# Patient Record
Sex: FEMALE | Race: WHITE | Employment: FULL TIME | URBAN - METROPOLITAN AREA
[De-identification: names, ages, dates, MRNs, and addresses within clinical notes are randomized per-mention and may not be internally consistent; named-entity substitution may affect disease eponyms.]

---

## 2018-09-28 ENCOUNTER — TELEPHONE (OUTPATIENT)
Dept: SURGERY | Age: 58
End: 2018-09-28

## 2018-10-03 ENCOUNTER — OFFICE VISIT (OUTPATIENT)
Dept: SURGERY | Age: 58
End: 2018-10-03

## 2018-10-03 ENCOUNTER — TELEPHONE (OUTPATIENT)
Dept: SURGERY | Age: 58
End: 2018-10-03

## 2018-10-03 VITALS
BODY MASS INDEX: 27.66 KG/M2 | TEMPERATURE: 97.2 F | WEIGHT: 166 LBS | HEIGHT: 65 IN | HEART RATE: 77 BPM | DIASTOLIC BLOOD PRESSURE: 82 MMHG | SYSTOLIC BLOOD PRESSURE: 153 MMHG

## 2018-10-03 DIAGNOSIS — N63.0 LUMP OR MASS IN BREAST: Primary | ICD-10-CM

## 2018-10-03 RX ORDER — ACETAMINOPHEN 325 MG/1
TABLET ORAL
COMMUNITY

## 2018-10-03 RX ORDER — GLUCOSAMINE SULFATE 1500 MG
1000 POWDER IN PACKET (EA) ORAL
COMMUNITY

## 2018-10-03 RX ORDER — DIAPER,BRIEF,ADULT, DISPOSABLE
EACH MISCELLANEOUS
COMMUNITY

## 2018-10-03 NOTE — PROGRESS NOTES
Chief Complaint   Patient presents with   [address] [place] [address] Breast Mass     Surgical evaluation of left breast mass. Referred by Dr. Ren Esquivel. Denies pain. 1. Have you been to the ER, urgent care clinic since your last visit? Hospitalized since your last visit? No    2. Have you seen or consulted any other health care providers outside of the [address] [place] [address] since your last visit? Include any pap smears or colon screening.  No

## 2018-10-03 NOTE — MR AVS SNAPSHOT
[address] [facility] [id number] 
[phone] Patient: Ector Reyna MRN: [id number] :1960 Visit Information Date & Time Provider Department Dept. Phone Encounter #  
 10/3/2018  3:30 PM Abdoul Montesinos MD [address] [phone] [id number] Upcoming Health Maintenance Date Due Hepatitis C Screening 1960 DTaP/Tdap/Td series (1 - Tdap) 1980 Shingrix Vaccine Age 50> (1 of 2) 2009 BREAST CANCER SCRN MAMMOGRAM 2009 FOBT Q 1 YEAR AGE 50-75 2009 PAP AKA CERVICAL CYTOLOGY 2014 Influenza Age 5 to Adult 2018 Allergies as of 10/3/2018  Review Complete On: 10/3/2018 By: Abdoul Montesinos MD  
  
 Severity Noted Reaction Type Reactions Erythromycin  2009    Nausea and Vomiting Severe abd cramps. \"out of body-like experience\" Current Immunizations  Never Reviewed No immunizations on file. Not reviewed this visit You Were Diagnosed With   
  
 Codes Comments Lump or mass in breast    -  Primary ICD-10-CM: N63.0 ICD-9-CM: 611.72 Vitals BP Pulse Temp Height(growth percentile) Weight(growth percentile) 153/82 (BP 1 Location: Right arm, BP Patient Position: Sitting) 77 97.2 °F (36.2 °C) (Oral) 5' 5\" (1.651 m) 166 lb (75.3 kg) BMI Smoking Status 27.62 kg/m2 Never Assessed BMI and BSA Data Body Mass Index Body Surface Area  
 27.62 kg/m 2 1.86 m 2 Your Updated Medication List  
  
   
This list is accurate as of 10/3/18  4:14 PM.  Always use your most recent med list.  
  
  
  
  
 cholecalciferol 1,000 unit Cap Commonly known as:  VITAMIN D3  
1,000 Units. L-LYSINE 500 mg Tab tablet Generic drug:  lysine Take  by mouth three (3) times daily (with meals). TYLENOL 325 mg tablet Generic drug:  acetaminophen Take  by mouth every four (4) hours as needed for Pain. Introducing [facility]! [facility] introduces Openet patient portal. Now you can access parts of your medical record, email your doctor's office, and request medication refills online. 1. In your internet browser, go to [email or web address] 2. Click on the First Time User? Click Here link in the Sign In box. You will see the New Member Sign Up page. 3. Enter your Openet Access Code exactly as it appears below. You will not need to use this code after youve completed the sign-up process. If you do not sign up before the expiration date, you must request a new code. · Openet Access Code: [email or web address] Expires: 1/1/2019  2:52 PM 
 
4. Enter the last four digits of your Social Security Number (xxxx) and Date of Birth (mm/dd/yyyy) as indicated and click Submit. You will be taken to the next sign-up page. 5. Create a Openet ID. This will be your Openet login ID and cannot be changed, so think of one that is secure and easy to remember. 6. Create a Openet password. You can change your password at any time. 7. Enter your Password Reset Question and Answer. This can be used at a later time if you forget your password. 8. Enter your e-mail address. You will receive e-mail notification when new information is available in [address] 9. Click Sign Up. You can now view and download portions of your medical record. 10. Click the Download Summary menu link to download a portable copy of your medical information. If you have questions, please visit the Frequently Asked Questions section of the Openet website. Remember, Openet is NOT to be used for urgent needs. For medical emergencies, dial 911. Now available from your iPhone and Android! Please provide this summary of care documentation to your next provider. Your primary care clinician is listed as Carmella Pinto.  If you have any questions after today's visit, please call [phone].

## 2018-10-03 NOTE — PROGRESS NOTES
Breat Consult      Ms. Kodi Regalado is a 61year old woman who was referred for abnormal imaging. The area of concern was identified on routine screening exam. She denies breast pain, mass or nipple discharge. Additional work up performed includes diagnostic mammogram and ultrasound. Her most recent previous mammogram was 9/21/17. There is no family history of breast cancer. Breast/GYN history:    Past Medical History:   Diagnosis Date    Herpes     HPV (human papilloma virus) anogenital infection        Past Surgical History:   Procedure Laterality Date    HX TONSIL AND ADENOIDECTOMY         No current outpatient prescriptions on file prior to visit. No current facility-administered medications on file prior to visit. Allergies   Allergen Reactions    Erythromycin Nausea and Vomiting     Severe abd cramps. \"out of body-like experience\"       Social History   Substance Use Topics    Smoking status: Not on file    Smokeless tobacco: Not on file    Alcohol use Not on file       No family history on file.     OB History     No data available            ROS:   General: denies fevers, chills, night sweats, fatigue, weight loss, weight gain  GI: denies nausea, vomiting, abdominal pain, change in bowel habits, hematochezia, melena, GERD  Integ: denies dermatitis, abnormal moles  HEENT: denies visual changes, vertigo, epistaxis, dysphagia, hoarseness  Cardiac: denies chest pain, palpitations, HTN, edema, varicosities  Resp: denies cough, shortness of breath, wheezing, hemoptysis, snoring, reactive airway disease  : denies hematuria, dysuria, frequency, urgency, nocturia, stress urinary incontinence   MSK: denies weakness, joint pain, arthritis  Endocrine: denies diabetes, thyroid disease, polyuria, polydipsia, polyphagia, poor wound healing, heat intolerance, cold intolerance  Lymph/Heme: denies anemia, bruising, history of blood transfusions  Neuro: denies dizziness, headache, fainting, seizures, stroke  Psych: denies anxiety, depression    Physical Exam:  Visit Vitals    /82 (BP 1 Location: Right arm, BP Patient Position: Sitting)    Pulse 77    Temp 97.2 °F (36.2 °C) (Oral)    Ht 5' 5\" (1.651 m)    Wt 75.3 kg (166 lb)    LMP Comment: menopause    BMI 27.62 kg/m2       Gen: Well developed, well nourished woman in no acute distress  Head: normocephalic, atraumatic  Mouth: Clear, no overt lesions, oral mucosa pink and moist  Neck: supple, no masses, no adenopathy, trachea midline  Resp: clear bilaterally  Cardio: Regular rate and rhythm  Breasts: Examined in both the supine and upright positions. There was no supraclavicular, infraclavicular, or axillary lympadenopathy. There were no dominant masses, no skin changes, no asymmetry identified   Abdomen: soft, nontender, nondistended  Extremeties: warm, well-perfused  Neuro: sensation and strength grossly intact and symmetrical  Psych: alert and oriented to person, place and time    Imagin18 left mammo/ultrasound  Left breast mass versus complicated cyst. Recommend ultrasound-guided core needle biopsy for further evaluation. Assessment Category 4: Suspicious     18 bilateral 3D mammogram  Questionable left upper outer quadrant architectural distortion. CC and MLO spot compression views with Tomosynthesis recommended. Targeted left breast ultrasound as indicated. The patient will receive an automated reminder and be contacted by the [address] [place] [address] staff regarding the need for additional imaging and to schedule her diagnostic appointment. Assessment Category 0: Incomplete - Need Additional Imaging Evaluation    Impression:  Patient Active Problem List   Diagnosis Code    Lump or mass in breast, left N63.0          61year old woman with abnormal imaging. The imaging was reviewed in detail. Ultrasound guided core biopsy explained and recommended. Risks, benefits and options discussed.  We discussed that after the biopsy bruising is quite common and it is normal to feel a \"lump\". Both generally resolve within a few weeks. We discussed the possibility of inaccurate result, though that is distinctly uncommon. Ms. Zahraa Penaloza understands and wishes to proceed. Follow up one week for site check and pathology review. Please call sooner with questions or concerns. [facility]  OFFICE PROCEDURE PROGRESS NOTE        Chart reviewed for the following:   Heydi Shah MD, have reviewed the History, Physical and updated the Allergic reactions for Germaine [address] [place] [address] Dr performed immediately prior to start of procedure:   Heydi Shah MD, have performed the following reviews on Maria Luisa Chamberlain prior to the start of the procedure:            * Patient was identified by name and date of birth   * Agreement on procedure being performed was verified  * Risks and Benefits explained to the patient  * Procedure site verified and marked as necessary  * Patient was positioned for comfort     Date of procedure: 10/3/2018    Indications: left breast mass 2:30 5cmfn    Procedure: limited breast ultrasound, ultrasound guided core biopsy, placement of clip marker    Procedure performed by:  Simone Long MD    Provider assisted by: Lorrie Forrest LPN    How tolerated by patient: tolerated the procedure well with no complications    Post Procedural Pain Scale: 0 - No Hurt    Anesthesia: none    Complications: none    Findings: hypoechoic oval lesion with angulated medial margin    Procedure in detail:  The patient was positioned. Copious ultrasound gel was used. The area of concern was examined in both transverse and sagittal orientation. The patient was immediately informed of ultrsaound findings.      We discussed the risks and benefits of  the procedure including, but not limited to, bleeding, infection, need for repeat procedure and inadequate tissue and she agrees to proceed. Patient was prepped and draped in the usual fashion, timeout was performed and all parties agreed with the proposed procedure. Local anesthesia was then instilled in the affected area. A core needle biopsy device was advanced into the affected area and the specimen was placed in formalin and sent for pathological evaluation. A total of three passes were made. A clip was placed under ultrasound guidance. Hemostasis was checked and deemed excellent and a dry dressing was placed. Patient tolerated the procedure well and there were no complications.

## 2018-10-03 NOTE — LETTER
10/3/2018 4:17 PM 
 
Patient:  Paige Courtney YOB: 1960 Date of Visit: 10/3/2018 Shaista Briceño MD 
[address] [facility] [address] VIA Facsimile: [phone] Beatriz Vasquez MD 
[facility] [address] [facility] [address] VIA Facsimile: [phone] Dear MD Beatriz Proctor MD, 
 
 
I had the pleasure of seeing Ms. Paige Courtney in my office today for abnormal mammogram.  I performed a biopsy in the office today and will inform you of the results. I am including a copy of my office visit today. If you have questions, please do not hesitate to call me. I look forward to following Ms. Liborio Gill along with you and will keep you updated as to her progress. Sincerely, Mira Gutierrez MD

## 2018-10-10 ENCOUNTER — TELEPHONE (OUTPATIENT)
Dept: SURGERY | Age: 58
End: 2018-10-10

## 2018-10-10 NOTE — TELEPHONE ENCOUNTER
Patient called to get results on biopsy. Spoke with Dr. Lizbeth Zimmer in regards who advised it's a cyst.  Patient verbalized understanding with no further questions.   Will follow up with appt. 10.11.18.

## 2018-10-11 ENCOUNTER — OFFICE VISIT (OUTPATIENT)
Dept: SURGERY | Age: 58
End: 2018-10-11

## 2018-10-11 VITALS
HEIGHT: 65 IN | DIASTOLIC BLOOD PRESSURE: 77 MMHG | HEART RATE: 80 BPM | WEIGHT: 165.4 LBS | SYSTOLIC BLOOD PRESSURE: 116 MMHG | TEMPERATURE: 97.4 F | BODY MASS INDEX: 27.56 KG/M2

## 2018-10-11 DIAGNOSIS — N63.0 LUMP OR MASS IN BREAST: Primary | ICD-10-CM

## 2018-10-11 NOTE — PROGRESS NOTES
Chief Complaint   Patient presents with    Surgical Follow-up     Post op left breast core biopsy. placement of climp. Denies pain, tender to touch. 10.3. 18.     1. Have you been to the ER, urgent care clinic since your last visit? Hospitalized since your last visit? No    2. Have you seen or consulted any other health care providers outside of the [address] [place] [address] since your last visit? Include any pap smears or colon screening.  No

## 2018-10-11 NOTE — PROGRESS NOTES
Ms. Tonya Montalvo is a 61year old woman who was referred for abnormal imaging. The area of concern was identified on routine screening exam. She denies breast pain, mass or nipple discharge. Additional work up performed includes diagnostic mammogram and ultrasound. Her most recent previous mammogram was 9/21/17. There is no family history of breast cancer. I performed ultrasound guided core biopsy 10/3/18 which demonstrated benign cystic changes. Breast/GYN history:    Past Medical History:   Diagnosis Date    Herpes     HPV (human papilloma virus) anogenital infection        Past Surgical History:   Procedure Laterality Date    HX TONSIL AND ADENOIDECTOMY         Current Outpatient Prescriptions on File Prior to Visit   Medication Sig Dispense Refill    cholecalciferol (VITAMIN D3) 1,000 unit cap 1,000 Units.  lysine (L-LYSINE) 500 mg tab tablet Take  by mouth three (3) times daily (with meals).  acetaminophen (TYLENOL) 325 mg tablet Take  by mouth every four (4) hours as needed for Pain. No current facility-administered medications on file prior to visit. Allergies   Allergen Reactions    Erythromycin Nausea and Vomiting     Severe abd cramps. \"out of body-like experience\"       Social History   Substance Use Topics    Smoking status: Not on file    Smokeless tobacco: Not on file    Alcohol use Not on file       No family history on file.     OB History     No data available            ROS:   General: denies fevers, chills, night sweats, fatigue, weight loss, weight gain  GI: denies nausea, vomiting, abdominal pain, change in bowel habits, hematochezia, melena, GERD  Integ: denies dermatitis, abnormal moles  HEENT: denies visual changes, vertigo, epistaxis, dysphagia, hoarseness  Cardiac: denies chest pain, palpitations, HTN, edema, varicosities  Resp: denies cough, shortness of breath, wheezing, hemoptysis, snoring, reactive airway disease  : denies hematuria, dysuria, frequency, urgency, nocturia, stress urinary incontinence   MSK: denies weakness, joint pain, arthritis  Endocrine: denies diabetes, thyroid disease, polyuria, polydipsia, polyphagia, poor wound healing, heat intolerance, cold intolerance  Lymph/Heme: denies anemia, bruising, history of blood transfusions  Neuro: denies dizziness, headache, fainting, seizures, stroke  Psych: denies anxiety, depression    Physical Exam:  Visit Vitals    /77 (BP 1 Location: Right arm, BP Patient Position: Sitting)    Pulse 80    Temp 97.4 °F (36.3 °C) (Oral)    Ht 5' 5\" (1.651 m)    Wt 75 kg (165 lb 6.4 oz)    BMI 27.52 kg/m2       Gen: Well developed, well nourished woman in no acute distress  Head: normocephalic, atraumatic  Mouth: Clear, no overt lesions, oral mucosa pink and moist  Neck: supple, no masses, no adenopathy, trachea midline  Resp: clear bilaterally  Cardio: Regular rate and rhythm  Breasts: Examined in both the supine and upright positions. There was no supraclavicular, infraclavicular, or axillary lympadenopathy. There were no dominant masses, no skin changes, no asymmetry identified incision clean, ecchymosis  Abdomen: soft, nontender, nondistended  Extremeties: warm, well-perfused  Neuro: sensation and strength grossly intact and symmetrical  Psych: alert and oriented to person, place and time    Pathology:  LEFT BREAST MASS, BIOPSY:   - FIBROUS BREAST TISSUE WITH ADENOSIS AND BENIGN CYSTS. - MICROCALCIFICATIONS PRESENT IN BENIGN LOBULES. - CLINICAL CORRELATION RECOMMENDED. Imagin18 left mammo/ultrasound  Left breast mass versus complicated cyst. Recommend ultrasound-guided core needle biopsy for further evaluation. Assessment Category 4: Suspicious     18 bilateral 3D mammogram  Questionable left upper outer quadrant architectural distortion. CC and MLO spot compression views with Tomosynthesis recommended.  Targeted left breast ultrasound as indicated. The patient will receive an automated reminder and be contacted by the [address] [place] [address] staff regarding the need for additional imaging and to schedule her diagnostic appointment. Assessment Category 0: Incomplete - Need Additional Imaging Evaluation    Impression:  Patient Active Problem List   Diagnosis Code    Lump or mass in breast, left N63.0          61year old woman with abnormal imaging. The pathology was reviewed in detail. We discussed that it is not malignant. It may cause \"lumpiness\" of the breasts and may cause pain and/or nipple discharge No additional surgical intervention is needed. She is due for bilateral mammogram September 2019. We will plan for conservative management with follow up in 3 months. Please call sooner with questions or concerns.

## 2018-10-11 NOTE — LETTER
10/11/2018 8:33 AM 
 
Patient:  Enrique Murdock YOB: 1960 Date of Visit: 10/11/2018 Luis Alfredo Sommers MD 
[address] [place] [address] [facility] [address] VIA Facsimile: [phone] Linus Shepherd MD 
[address] [facility] [address] VIA Facsimile: [phone] Dear MD Linus Bello MD, 
 
 
I had the pleasure of seeing Ms. David Moulton in my office today for follow up after benign breast biopsy. I am including a copy of my office visit today. If you have questions, please do not hesitate to call me. I look forward to following Ms. Lorrie Joshi along with you and will keep you updated as to her progress. Sincerely, Hui Gentile MD

## 2018-12-13 ENCOUNTER — TELEPHONE (OUTPATIENT)
Dept: SURGERY | Age: 58
End: 2018-12-13